# Patient Record
(demographics unavailable — no encounter records)

---

## 2024-12-01 NOTE — FAMILY HISTORY
[___ inches] : [unfilled] inches [FreeTextEntry3] : +/- 2SDs  [FreeTextEntry5] : 14 y/o  [FreeTextEntry4] : MGM - menarche 12 y/o, PGM - menarche 10 y/o  [FreeTextEntry2] : 3 y/o brother - healthy except strabismus

## 2024-12-01 NOTE — CONSULT LETTER
[Dear  ___] : Dear  [unfilled], [Consult Letter:] : I had the pleasure of evaluating your patient, [unfilled]. [( Thank you for referring [unfilled] for consultation for _____ )] : Thank you for referring [unfilled] for consultation for [unfilled] [Please see my note below.] : Please see my note below. [Consult Closing:] : Thank you very much for allowing me to participate in the care of this patient.  If you have any questions, please do not hesitate to contact me. [Sincerely,] : Sincerely, [FreeTextEntry3] : Ashleigh Mello MD Pediatric Endocrinology [facility]

## 2024-12-01 NOTE — HISTORY OF PRESENT ILLNESS
[Premenarchal] : premenarchal [FreeTextEntry2] : 7 yr 1 mo female who presents for follow up of early onset pubic hair, body odor, axillary hair   Last visit; 06/20/2024 (initial visit)   At initial visit: Mother reported that noticed apocrine body odor since 4 y/o Noticed pubic hair and axillary hair at 7 y/o  Denied breast development or vaginal discharge  Denied rapid growth  Denied acne Denied known exposure to exogenous testosterone  Denied FH of early puberty or CAH   Since last visit: No ER visits/hospitalizations/major illnesses Growth?  Progression?

## 2024-12-01 NOTE — DATA REVIEWED
[FreeTextEntry1] : Review of Laboratory Evaluation 07/13/2024 08:31 AM [facility] Total testo 13 (Rush 1:  8 or less, Rush 2: 24 or less) 17 OHP 70 (<137)   Androstenedione 59 (7 y/o: < 45) -high DHEA 634 (7 y/o: <487) -high DHEAS 190 (4.7 y/o: < 29, Rush 2 female: ) -high   08/15/2024 ACTH Stim testing   Review of imaging 8/1/2024 Bone age ([facility])  CA 6 years 10 months, BA 7 years 3 months I viewed the bone age and read as closer to 7 years 10 months.   Review of Growth Chart from PMD (growth points from 2 to 8 y/o provided by PMD; difficult to interpret as no percentile lines on the growth chart   Height:: appears to have grown 3 inches from 10/2021 to 10/22 (40.5---> 43.5'')  and about 3.3 inches from 10/2022 to 09/2023 (43.5--> 46.8).  However, today's height in the office in 06/2024 measured by me is 46.6'' which is less than the 09/2023 measurement of 46.8'' - erroneous measurement?  Weight: steady weight gain

## 2024-12-01 NOTE — PAST MEDICAL HISTORY
[At Term] : at term [Normal Vaginal Route] : by normal vaginal route [None] : there were no delivery complications [Age Appropriate] : age appropriate developmental milestones met [FreeTextEntry1] : BW 7 lbs 2 oz, BL 20 inches

## 2024-12-01 NOTE — FAMILY HISTORY
[___ inches] : [unfilled] inches [FreeTextEntry3] : +/- 2SDs  [FreeTextEntry5] : 12 y/o  [FreeTextEntry4] : MGM - menarche 14 y/o, PGM - menarche 12 y/o  [FreeTextEntry2] : 3 y/o brother - healthy except strabismus

## 2024-12-01 NOTE — ASSESSMENT
[FreeTextEntry1] : 6 year 9 months with premature pubarche (onset of body odor ~4 y/o , onset of pubic and axillary hair ~5 y/o) with no signs of gonadarache (no breast development or vaginal discharge)    Discussed that premature  pubarche is defined as appearance of pubic hair and/or axillary hair and/or apocrine body odor and/or acne in girls before 9 y/o.  Although the most common cause of premature pubarche is premature adrenarche, I would like to obtain early morning (7-8 AM) labs to r/o adrenal pathology such as NCCAH/adrenal tumor.   -Advised 7-8 AM BW to r/o adrenal pathology  -Mom to let me know if any rapid development noted  -RTC in 4 months

## 2024-12-01 NOTE — FAMILY HISTORY
[___ inches] : [unfilled] inches [FreeTextEntry3] : +/- 2SDs  [FreeTextEntry5] : 14 y/o  [FreeTextEntry4] : MGM - menarche 14 y/o, PGM - menarche 12 y/o  [FreeTextEntry2] : 5 y/o brother - healthy except strabismus

## 2024-12-01 NOTE — ASSESSMENT
[FreeTextEntry1] : 6 year 9 months with premature pubarche (onset of body odor ~4 y/o , onset of pubic and axillary hair ~7 y/o) with no signs of gonadarache (no breast development or vaginal discharge)    Discussed that premature  pubarche is defined as appearance of pubic hair and/or axillary hair and/or apocrine body odor and/or acne in girls before 7 y/o.  Although the most common cause of premature pubarche is premature adrenarche, I would like to obtain early morning (7-8 AM) labs to r/o adrenal pathology such as NCCAH/adrenal tumor.   -Advised 7-8 AM BW to r/o adrenal pathology  -Mom to let me know if any rapid development noted  -RTC in 4 months

## 2024-12-01 NOTE — CONSULT LETTER
[Dear  ___] : Dear  [unfilled], [Consult Letter:] : I had the pleasure of evaluating your patient, [unfilled]. [( Thank you for referring [unfilled] for consultation for _____ )] : Thank you for referring [unfilled] for consultation for [unfilled] [Please see my note below.] : Please see my note below. [Consult Closing:] : Thank you very much for allowing me to participate in the care of this patient.  If you have any questions, please do not hesitate to contact me. [Sincerely,] : Sincerely, [FreeTextEntry3] : Ashleigh Mello MD Pediatric Endocrinology [facility]

## 2024-12-01 NOTE — PHYSICAL EXAM
[Healthy Appearing] : healthy appearing [Interactive] : interactive [Normal Appearance] : normal appearance [Well formed] : well formed [WNL for age] : within normal limits of age [Normal S1 and S2] : normal S1 and S2 [Clear to Ausculation Bilaterally] : clear to auscultation bilaterally [Abdomen Soft] : soft [Abdomen Tenderness] : non-tender [] : no hepatosplenomegaly [Normal] : normal  [Dysmorphic] : non-dysmorphic [Hirsutism] : no hirsutism [Goiter] : no goiter [de-identified] : small single hypopigmented macule on anterior trunk lateral to left areola , no CALMs  [de-identified] : Rush 1 breasts, Rush 2 PH ( a few strands of dark countable pubic hair) , + mild axillary hair  [de-identified] : +2 DTRs

## 2024-12-01 NOTE — HISTORY OF PRESENT ILLNESS
[Premenarchal] : premenarchal [FreeTextEntry2] : 7 yr 1 mo female who presents for follow up of early onset pubic hair, body odor, axillary hair   Last visit; 06/20/2024 (initial visit)   At initial visit: Mother reported that noticed apocrine body odor since 4 y/o Noticed pubic hair and axillary hair at 5 y/o  Denied breast development or vaginal discharge  Denied rapid growth  Denied acne Denied known exposure to exogenous testosterone  Denied FH of early puberty or CAH   Since last visit: No ER visits/hospitalizations/major illnesses Growth?  Progression?

## 2024-12-01 NOTE — HISTORY OF PRESENT ILLNESS
[Premenarchal] : premenarchal [FreeTextEntry2] : 7 yr 1 mo female who presents for follow up of early onset pubic hair, body odor, axillary hair   Last visit; 06/20/2024 (initial visit)   At initial visit: Mother reported that noticed apocrine body odor since 6 y/o Noticed pubic hair and axillary hair at 5 y/o  Denied breast development or vaginal discharge  Denied rapid growth  Denied acne Denied known exposure to exogenous testosterone  Denied FH of early puberty or CAH   Since last visit: No ER visits/hospitalizations/major illnesses Growth?  Progression?

## 2024-12-01 NOTE — PHYSICAL EXAM
[Healthy Appearing] : healthy appearing [Interactive] : interactive [Normal Appearance] : normal appearance [Well formed] : well formed [WNL for age] : within normal limits of age [Normal S1 and S2] : normal S1 and S2 [Clear to Ausculation Bilaterally] : clear to auscultation bilaterally [Abdomen Soft] : soft [Abdomen Tenderness] : non-tender [] : no hepatosplenomegaly [Normal] : normal  [Dysmorphic] : non-dysmorphic [Hirsutism] : no hirsutism [Goiter] : no goiter [de-identified] : small single hypopigmented macule on anterior trunk lateral to left areola , no CALMs  [de-identified] : Rush 1 breasts, Rush 2 PH ( a few strands of dark countable pubic hair) , + mild axillary hair  [de-identified] : +2 DTRs

## 2024-12-01 NOTE — CONSULT LETTER
[Dear  ___] : Dear  [unfilled], [Consult Letter:] : I had the pleasure of evaluating your patient, [unfilled]. [( Thank you for referring [unfilled] for consultation for _____ )] : Thank you for referring [unfilled] for consultation for [unfilled] [Please see my note below.] : Please see my note below. [Consult Closing:] : Thank you very much for allowing me to participate in the care of this patient.  If you have any questions, please do not hesitate to contact me. [Sincerely,] : Sincerely, [FreeTextEntry3] : Ashleigh Mello MD Pediatric Endocrinology [facility]

## 2024-12-01 NOTE — ASSESSMENT
[FreeTextEntry1] : 6 year 9 months with premature pubarche (onset of body odor ~6 y/o , onset of pubic and axillary hair ~5 y/o) with no signs of gonadarache (no breast development or vaginal discharge)    Discussed that premature  pubarche is defined as appearance of pubic hair and/or axillary hair and/or apocrine body odor and/or acne in girls before 7 y/o.  Although the most common cause of premature pubarche is premature adrenarche, I would like to obtain early morning (7-8 AM) labs to r/o adrenal pathology such as NCCAH/adrenal tumor.   -Advised 7-8 AM BW to r/o adrenal pathology  -Mom to let me know if any rapid development noted  -RTC in 4 months

## 2024-12-01 NOTE — PHYSICAL EXAM
[Healthy Appearing] : healthy appearing [Interactive] : interactive [Normal Appearance] : normal appearance [Well formed] : well formed [WNL for age] : within normal limits of age [Normal S1 and S2] : normal S1 and S2 [Clear to Ausculation Bilaterally] : clear to auscultation bilaterally [Abdomen Soft] : soft [Abdomen Tenderness] : non-tender [] : no hepatosplenomegaly [Normal] : normal  [Dysmorphic] : non-dysmorphic [Hirsutism] : no hirsutism [Goiter] : no goiter [de-identified] : small single hypopigmented macule on anterior trunk lateral to left areola , no CALMs  [de-identified] : Rush 1 breasts, Rush 2 PH ( a few strands of dark countable pubic hair) , + mild axillary hair  [de-identified] : +2 DTRs

## 2024-12-01 NOTE — REVIEW OF SYSTEMS
[Nl] : Neurological [Pubertal Concerns] : pubertal concerns [Headache] : no headache [Cold Intolerance] : no intolerance to cold [Heat Intolerance] : no intolerance to heat [Polydypsia] : no polydipsia [Polyuria] : no polyuria

## 2024-12-01 NOTE — CONSULT LETTER
[Dear  ___] : Dear  [unfilled], [Consult Letter:] : I had the pleasure of evaluating your patient, [unfilled]. [( Thank you for referring [unfilled] for consultation for _____ )] : Thank you for referring [unfilled] for consultation for [unfilled] [Please see my note below.] : Please see my note below. [Consult Closing:] : Thank you very much for allowing me to participate in the care of this patient.  If you have any questions, please do not hesitate to contact me. [Sincerely,] : Sincerely, [FreeTextEntry3] : Ashleigh Mello MD Pediatric Endocrinology [facility]

## 2024-12-01 NOTE — DATA REVIEWED
[FreeTextEntry1] : Review of Laboratory Evaluation 07/13/2024 08:31 AM [facility] Total testo 13 (Rush 1:  8 or less, Rush 2: 24 or less) 17 OHP 70 (<137)   Androstenedione 59 (5 y/o: < 45) -high DHEA 634 (5 y/o: <487) -high DHEAS 190 (4.5 y/o: < 29, Rush 2 female: ) -high   08/15/2024 ACTH Stim testing   Review of imaging 8/1/2024 Bone age ([facility])  CA 6 years 10 months, BA 7 years 3 months I viewed the bone age and read as closer to 7 years 10 months.   Review of Growth Chart from PMD (growth points from 2 to 6 y/o provided by PMD; difficult to interpret as no percentile lines on the growth chart   Height:: appears to have grown 3 inches from 10/2021 to 10/22 (40.5---> 43.5'')  and about 3.3 inches from 10/2022 to 09/2023 (43.5--> 46.8).  However, today's height in the office in 06/2024 measured by me is 46.6'' which is less than the 09/2023 measurement of 46.8'' - erroneous measurement?  Weight: steady weight gain

## 2024-12-01 NOTE — DATA REVIEWED
[FreeTextEntry1] : Review of Laboratory Evaluation 07/13/2024 08:31 AM [facility] Total testo 13 (Rush 1:  8 or less, Rush 2: 24 or less) 17 OHP 70 (<137)   Androstenedione 59 (5 y/o: < 45) -high DHEA 634 (5 y/o: <487) -high DHEAS 190 (4.5 y/o: < 29, Rush 2 female: ) -high   08/15/2024 ACTH Stim testing   Review of imaging 8/1/2024 Bone age ([facility])  CA 6 years 10 months, BA 7 years 3 months I viewed the bone age and read as closer to 7 years 10 months.   Review of Growth Chart from PMD (growth points from 2 to 8 y/o provided by PMD; difficult to interpret as no percentile lines on the growth chart   Height:: appears to have grown 3 inches from 10/2021 to 10/22 (40.5---> 43.5'')  and about 3.3 inches from 10/2022 to 09/2023 (43.5--> 46.8).  However, today's height in the office in 06/2024 measured by me is 46.6'' which is less than the 09/2023 measurement of 46.8'' - erroneous measurement?  Weight: steady weight gain

## 2024-12-01 NOTE — PHYSICAL EXAM
[Healthy Appearing] : healthy appearing [Interactive] : interactive [Normal Appearance] : normal appearance [Well formed] : well formed [WNL for age] : within normal limits of age [Normal S1 and S2] : normal S1 and S2 [Clear to Ausculation Bilaterally] : clear to auscultation bilaterally [Abdomen Soft] : soft [Abdomen Tenderness] : non-tender [] : no hepatosplenomegaly [Normal] : normal  [Dysmorphic] : non-dysmorphic [Hirsutism] : no hirsutism [Goiter] : no goiter [de-identified] : small single hypopigmented macule on anterior trunk lateral to left areola , no CALMs  [de-identified] : Rush 1 breasts, Rush 2 PH ( a few strands of dark countable pubic hair) , + mild axillary hair  [de-identified] : +2 DTRs

## 2024-12-01 NOTE — DATA REVIEWED
[FreeTextEntry1] : Review of Laboratory Evaluation 07/13/2024 08:31 AM [facility] Total testo 13 (Rush 1:  8 or less, Rush 2: 24 or less) 17 OHP 70 (<137)   Androstenedione 59 (7 y/o: < 45) -high DHEA 634 (7 y/o: <487) -high DHEAS 190 (4.7 y/o: < 29, Rush 2 female: ) -high   08/15/2024 ACTH Stim testing   Review of imaging 8/1/2024 Bone age ([facility])  CA 6 years 10 months, BA 7 years 3 months I viewed the bone age and read as closer to 7 years 10 months.   Review of Growth Chart from PMD (growth points from 2 to 6 y/o provided by PMD; difficult to interpret as no percentile lines on the growth chart   Height:: appears to have grown 3 inches from 10/2021 to 10/22 (40.5---> 43.5'')  and about 3.3 inches from 10/2022 to 09/2023 (43.5--> 46.8).  However, today's height in the office in 06/2024 measured by me is 46.6'' which is less than the 09/2023 measurement of 46.8'' - erroneous measurement?  Weight: steady weight gain

## 2024-12-01 NOTE — FAMILY HISTORY
[___ inches] : [unfilled] inches [FreeTextEntry3] : +/- 2SDs  [FreeTextEntry5] : 12 y/o  [FreeTextEntry4] : MGM - menarche 14 y/o, PGM - menarche 12 y/o  [FreeTextEntry2] : 5 y/o brother - healthy except strabismus